# Patient Record
Sex: FEMALE | Race: WHITE | NOT HISPANIC OR LATINO | Employment: FULL TIME | URBAN - METROPOLITAN AREA
[De-identification: names, ages, dates, MRNs, and addresses within clinical notes are randomized per-mention and may not be internally consistent; named-entity substitution may affect disease eponyms.]

---

## 2019-03-28 ENCOUNTER — TELEPHONE (OUTPATIENT)
Dept: SCHEDULING | Facility: IMAGING CENTER | Age: 24
End: 2019-03-28

## 2019-03-29 ENCOUNTER — OFFICE VISIT (OUTPATIENT)
Dept: URGENT CARE | Facility: CLINIC | Age: 24
End: 2019-03-29
Payer: COMMERCIAL

## 2019-03-29 VITALS
OXYGEN SATURATION: 99 % | RESPIRATION RATE: 12 BRPM | HEART RATE: 82 BPM | TEMPERATURE: 97.7 F | SYSTOLIC BLOOD PRESSURE: 120 MMHG | HEIGHT: 66 IN | WEIGHT: 153.4 LBS | DIASTOLIC BLOOD PRESSURE: 84 MMHG | BODY MASS INDEX: 24.65 KG/M2

## 2019-03-29 DIAGNOSIS — R10.9 RIGHT FLANK PAIN: ICD-10-CM

## 2019-03-29 DIAGNOSIS — R30.0 DYSURIA: ICD-10-CM

## 2019-03-29 DIAGNOSIS — N30.01 ACUTE CYSTITIS WITH HEMATURIA: Primary | ICD-10-CM

## 2019-03-29 LAB
APPEARANCE UR: CLEAR
BILIRUB UR STRIP-MCNC: NORMAL MG/DL
COLOR UR AUTO: YELLOW
GLUCOSE UR STRIP.AUTO-MCNC: NORMAL MG/DL
KETONES UR STRIP.AUTO-MCNC: NORMAL MG/DL
LEUKOCYTE ESTERASE UR QL STRIP.AUTO: NORMAL
NITRITE UR QL STRIP.AUTO: NORMAL
PH UR STRIP.AUTO: 6 [PH] (ref 5–8)
PROT UR QL STRIP: NEGATIVE MG/DL
RBC UR QL AUTO: NORMAL
SP GR UR STRIP.AUTO: 1
UROBILINOGEN UR STRIP-MCNC: 0.2 MG/DL

## 2019-03-29 PROCEDURE — 81002 URINALYSIS NONAUTO W/O SCOPE: CPT | Performed by: NURSE PRACTITIONER

## 2019-03-29 PROCEDURE — 99203 OFFICE O/P NEW LOW 30 MIN: CPT | Performed by: NURSE PRACTITIONER

## 2019-03-29 RX ORDER — NITROFURANTOIN 25; 75 MG/1; MG/1
100 CAPSULE ORAL EVERY 12 HOURS
Qty: 10 CAP | Refills: 0 | Status: SHIPPED | OUTPATIENT
Start: 2019-03-29 | End: 2019-04-03

## 2019-03-29 RX ORDER — PHENAZOPYRIDINE HYDROCHLORIDE 200 MG/1
200 TABLET, FILM COATED ORAL 3 TIMES DAILY
Qty: 6 TAB | Refills: 0 | Status: SHIPPED | OUTPATIENT
Start: 2019-03-29 | End: 2019-03-31

## 2019-03-29 ASSESSMENT — ENCOUNTER SYMPTOMS
VOMITING: 0
NAUSEA: 0
FLANK PAIN: 1
SWEATS: 0
CHILLS: 0

## 2019-03-29 NOTE — PROGRESS NOTES
"Subjective:      Hannah Harry is a 23 y.o. female who presents with UTI (X 4 days, right side pain)    Denies past medical, surgical or family history that is significant to today's problem.   RX or OTC medications-- reviewed with patient today.   No Known Allergies          Dysuria    This is a new problem. The current episode started in the past 7 days. The problem has been gradually worsening. The quality of the pain is described as aching and burning. The pain is at a severity of 5/10. The pain is moderate. There has been no fever. She is sexually active. There is no history of pyelonephritis. Associated symptoms include flank pain (right side) and frequency. Pertinent negatives include no chills, discharge, hematuria, hesitancy, nausea, possible pregnancy, sweats, urgency or vomiting. Associated symptoms comments: LMP:  3 1/2  weeks ago  . She has tried NSAIDs and increased fluids for the symptoms. The treatment provided mild relief. There is no history of kidney stones, recurrent UTIs or urinary stasis.       Review of Systems   Constitutional: Negative for chills.   Gastrointestinal: Negative for nausea and vomiting.   Genitourinary: Positive for dysuria, flank pain (right side) and frequency. Negative for hematuria, hesitancy and urgency.          Objective:     /84 (BP Location: Left arm, Patient Position: Sitting, BP Cuff Size: Adult)   Pulse 82   Temp 36.5 °C (97.7 °F) (Temporal)   Resp 12   Ht 1.676 m (5' 6\")   Wt 69.6 kg (153 lb 6.4 oz)   SpO2 99%   BMI 24.76 kg/m²      Physical Exam   Constitutional: Vital signs are normal. She appears well-developed and well-nourished. She is cooperative.  Non-toxic appearance. She does not appear ill. No distress.   HENT:   Head: Normocephalic.   Eyes: Pupils are equal, round, and reactive to light.   Cardiovascular: Normal rate and regular rhythm.    Pulmonary/Chest: Effort normal and breath sounds normal.   Abdominal: Soft. Normal appearance. She " exhibits no distension. There is no tenderness. There is no rigidity, no rebound, no guarding and no CVA tenderness.   Musculoskeletal:        Lumbar back: Normal.   Neurological: She is alert.   Skin: Skin is warm and dry. Capillary refill takes less than 2 seconds. She is not diaphoretic.   Psychiatric: She has a normal mood and affect. Her behavior is normal. Thought content normal.   Nursing note and vitals reviewed.          Ref Range & Units Status     POC Color yellow  Negative Final   POC Appearance clear  Negative Final   POC Leukocyte Esterase small  Negative Final   POC Nitrites neg  Negative Final   POC Urobiligen 0.2  Negative (0.2) mg/dL Final   POC Protein negative  Negative mg/dL Final   POC Urine PH 6.0  5.0 - 8.0 Final   POC Blood moderate  Negative Final   POC Specific Gravity 1.005  <1.005 - >1.030 Final   POC Ketones neg  Negative mg/dL Final   POC Bilirubin neg  Negative mg/dL Final   POC Glucose neg  Negative mg/dL Final   Last Resulted Time   Fri Mar 29, 2019 11:42 AM            Assessment/Plan:     1. Acute cystitis with hematuria    - nitrofurantoin monohyd macro (MACROBID) 100 MG Cap; Take 1 Cap by mouth every 12 hours for 5 days.  Dispense: 10 Cap; Refill: 0    2. Dysuria    - POCT Urinalysis  - phenazopyridine (PYRIDIUM) 200 MG Tab; Take 1 Tab by mouth 3 times a day for 2 days.  Dispense: 6 Tab; Refill: 0    3. Right flank pain    - POCT Urinalysis      Educated in proper administration of medication(s) ordered today including safety, possible SE, risks, benefits, rationale and alternatives to therapy.     Keep well hydrated    Return to clinic or PCP 5-7  days if current symptoms are not resolving in a satisfactory manner or sooner if new or worsening symptoms occur.   Patient was advised of signs and symptoms which would warrant further evaluation and /or emergent evaluation in ER.  Verbalized agreement with this treatment plan and seemed to understand without barriers. Questions were  encouraged and answered to patients satisfaction.     Aftercare instructions were given to pt

## 2019-04-09 ENCOUNTER — OFFICE VISIT (OUTPATIENT)
Dept: MEDICAL GROUP | Age: 24
End: 2019-04-09
Payer: COMMERCIAL

## 2019-04-09 ENCOUNTER — HOSPITAL ENCOUNTER (OUTPATIENT)
Facility: MEDICAL CENTER | Age: 24
End: 2019-04-09
Attending: PHYSICIAN ASSISTANT
Payer: COMMERCIAL

## 2019-04-09 VITALS
WEIGHT: 155.6 LBS | HEIGHT: 67 IN | TEMPERATURE: 98.8 F | SYSTOLIC BLOOD PRESSURE: 106 MMHG | HEART RATE: 64 BPM | DIASTOLIC BLOOD PRESSURE: 62 MMHG | OXYGEN SATURATION: 96 % | BODY MASS INDEX: 24.42 KG/M2

## 2019-04-09 DIAGNOSIS — R35.0 URINARY FREQUENCY: ICD-10-CM

## 2019-04-09 DIAGNOSIS — N30.01 ACUTE CYSTITIS WITH HEMATURIA: Primary | ICD-10-CM

## 2019-04-09 DIAGNOSIS — Z76.89 ENCOUNTER TO ESTABLISH CARE WITH NEW DOCTOR: ICD-10-CM

## 2019-04-09 DIAGNOSIS — Z87.42 HISTORY OF ABNORMAL CERVICAL PAP SMEAR: ICD-10-CM

## 2019-04-09 LAB
APPEARANCE UR: CLEAR
BILIRUB UR STRIP-MCNC: NEGATIVE MG/DL
COLOR UR AUTO: NORMAL
GLUCOSE UR STRIP.AUTO-MCNC: NEGATIVE MG/DL
KETONES UR STRIP.AUTO-MCNC: NEGATIVE MG/DL
LEUKOCYTE ESTERASE UR QL STRIP.AUTO: NORMAL
NITRITE UR QL STRIP.AUTO: NEGATIVE
PH UR STRIP.AUTO: 6.5 [PH] (ref 5–8)
PROT UR QL STRIP: NEGATIVE MG/DL
RBC UR QL AUTO: NORMAL
SP GR UR STRIP.AUTO: 1.01
UROBILINOGEN UR STRIP-MCNC: 0.2 MG/DL

## 2019-04-09 PROCEDURE — 99214 OFFICE O/P EST MOD 30 MIN: CPT | Performed by: PHYSICIAN ASSISTANT

## 2019-04-09 PROCEDURE — 81002 URINALYSIS NONAUTO W/O SCOPE: CPT | Performed by: PHYSICIAN ASSISTANT

## 2019-04-09 PROCEDURE — 87077 CULTURE AEROBIC IDENTIFY: CPT

## 2019-04-09 PROCEDURE — 87186 SC STD MICRODIL/AGAR DIL: CPT

## 2019-04-09 PROCEDURE — 87086 URINE CULTURE/COLONY COUNT: CPT

## 2019-04-09 RX ORDER — SULFAMETHOXAZOLE AND TRIMETHOPRIM 800; 160 MG/1; MG/1
1 TABLET ORAL 2 TIMES DAILY
Qty: 10 TAB | Refills: 0 | Status: SHIPPED | OUTPATIENT
Start: 2019-04-09 | End: 2019-04-14

## 2019-04-09 RX ORDER — PHENAZOPYRIDINE HYDROCHLORIDE 100 MG/1
99.5 TABLET, FILM COATED ORAL
COMMUNITY
End: 2019-04-09

## 2019-04-09 ASSESSMENT — PATIENT HEALTH QUESTIONNAIRE - PHQ9: CLINICAL INTERPRETATION OF PHQ2 SCORE: 0

## 2019-04-09 NOTE — PROGRESS NOTES
This medical record contains text that has been entered with the assistance of computer voice recognition and dictation software.  Therefore, it may contain unintended errors in text, spelling, punctuation, or grammar    Chief Complaint   Patient presents with   • Establish Care   • Abnormal Pap Smear     2016, 2017   • UTI      3-29-19 still has symptoms sensation, frequency urination         Hannah Angel is a 23 y.o. female here evaluation and management of: Establish care and urinary frequency      HPI:     Hannah presents to [facility].  She has never been seen by her primary care provider, she was just going to the [facility] clinic when needed.  She works at [employer].  She has had 2 abnormal Paps, last was in 2017, she was advised to repeat Pap in 2018, but was unable to do this.  She is currently in a monogamous relationship.  She is on birth control, which she tolerates well.    Urinary frequency  -She was seen in urgent care 3/29 and treated for urinary tract infection.  She completed a 5-day course of Macrobid.  Her symptoms seem to be improving, however when she stopped the antibiotics she still continued to have urinary frequency/urgency-however, this is improving.  She would like to ensure that she still does not have urinary tract infection.  Denies dysuria, hematuria, back pain, suprapubic pain, nausea, vomiting, fevers, or chills.    Current medicines (including changes today)  Current Outpatient Prescriptions   Medication Sig Dispense Refill   • Norgestim-Eth Estrad Triphasic (NORGESTIMATE-ETHINYL ESTRADIOL PO) Take  by mouth.     • sulfamethoxazole-trimethoprim (BACTRIM DS) 800-160 MG tablet Take 1 Tab by mouth 2 times a day for 5 days. 10 Tab 0     No current facility-administered medications for this visit.      She  has no past medical history on file.  She  has no past surgical history on file.  Social History   Substance Use Topics   • Smoking status: Never Smoker   • Smokeless  "tobacco: Never Used   • Alcohol use Yes      Comment: occasionally     Social History     Social History Narrative   • No narrative on file     Family History   Problem Relation Age of Onset   • Hypertension Mother    • No Known Problems Father      Family Status   Relation Status   • Mo Alive   • Fa Alive         ROS    Please see hpi      Objective:     /62 (BP Location: Right arm, Patient Position: Sitting, BP Cuff Size: Adult)   Pulse 64   Temp 37.1 °C (98.8 °F) (Temporal)   Ht 1.702 m (5' 7\")   Wt 70.6 kg (155 lb 9.6 oz)   SpO2 96%  Body mass index is 24.37 kg/m².  Physical Exam:    Constitutional: Alert, no distress.  Skin: Warm, dry, good turgor, no rashes in visible areas  Eye: conjunctiva clear, lids normal.  ENMT: Lips without lesions, good dentition, oropharynx clear.  Neck: Trachea midline, no masses, no thyromegaly. No cervical or supraclavicular lymphadenopathy.  Respiratory: Unlabored respiratory effort, lungs clear to auscultation, no wheezes, no ronchi.  Cardiovascular: Normal S1, S2, no murmur  Abdomen: Soft, non-tender, no masses, no hepatosplenomegaly, no suprapubic tenderness.  Back: No CVA tenderness  Psych: Alert and oriented x3, normal affect and mood.    Results for TRELL BUSTOS (MRN [id number]) as of 4/9/2019 17:17   Ref. Range 4/9/2019 17:04   POC Color Latest Ref Range: Negative  PALE   POC Appearance Latest Ref Range: Negative  CLEAR   POC Specific Gravity Latest Ref Range: <1.005 - >1.030  1.010   POC Urine PH Latest Ref Range: 5.0 - 8.0  6.5   POC Glucose Latest Ref Range: Negative mg/dL NEGATIVE   POC Ketones Latest Ref Range: Negative mg/dL NEGATIVE   POC Protein Latest Ref Range: Negative mg/dL NEGATIVE   POC Nitrites Latest Ref Range: Negative  NEGATIVE   POC Leukocyte Esterase Latest Ref Range: Negative  MODERATE   POC Blood Latest Ref Range: Negative  TRACE   POC Bilirubin Latest Ref Range: Negative mg/dL NEGATIVE   POC Urobiligen Latest Ref Range: Negative (0.2) " mg/dL 0.2         Assessment and Plan:   The following treatment plan was discussed    1.  Acute cystitis with hematuria  -UA was positive for leuks and Heme.  She is still symptomatic.  She was already treated with Macrobid. Will  start on Bactrim for 5 days.  Urine sent for culture.  Recommended increased hydration, urination after intercourse, and proper hygiene.  Advised that if she has worsening of symptoms, presentation of back pain, fever, nausea.  Follow-up immediately.    2. Urinary frequency  - POCT Urinalysis    3. Encounter to establish care with new doctor  -Advised to increase physical activity as tolerated and regular diet controlled.  Follow-up in 6 months for annual physical    4. History of abnormal cervical Pap smear  -She will follow-up with gynecology for Pap            Instructed to Follow up in clinic or ER for worsening symptoms, difficulty breathing, lack of expected recovery, or should new symptoms or problems arise.    Followup: Return in about 6 months (around 10/9/2019) for Annual PX.       Once again this medical record contains text that has been entered with the assistance of computer voice recognition and dictation software.  Therefore, it may contain unintended errors in text, spelling, punctuation, or grammar

## 2019-04-12 LAB
BACTERIA UR CULT: ABNORMAL
BACTERIA UR CULT: ABNORMAL
SIGNIFICANT IND 70042: ABNORMAL
SITE SITE: ABNORMAL
SOURCE SOURCE: ABNORMAL

## 2019-06-28 ENCOUNTER — OFFICE VISIT (OUTPATIENT)
Dept: URGENT CARE | Facility: CLINIC | Age: 24
End: 2019-06-28
Payer: COMMERCIAL

## 2019-06-28 ENCOUNTER — HOSPITAL ENCOUNTER (OUTPATIENT)
Facility: MEDICAL CENTER | Age: 24
End: 2019-06-28
Attending: PHYSICIAN ASSISTANT
Payer: COMMERCIAL

## 2019-06-28 VITALS
TEMPERATURE: 97.8 F | RESPIRATION RATE: 18 BRPM | OXYGEN SATURATION: 98 % | HEART RATE: 75 BPM | SYSTOLIC BLOOD PRESSURE: 132 MMHG | WEIGHT: 155 LBS | BODY MASS INDEX: 24.33 KG/M2 | HEIGHT: 67 IN | DIASTOLIC BLOOD PRESSURE: 68 MMHG

## 2019-06-28 DIAGNOSIS — R30.0 DYSURIA: Primary | ICD-10-CM

## 2019-06-28 DIAGNOSIS — R30.0 DYSURIA: ICD-10-CM

## 2019-06-28 LAB
APPEARANCE UR: NORMAL
BILIRUB UR STRIP-MCNC: NORMAL MG/DL
COLOR UR AUTO: NORMAL
GLUCOSE UR STRIP.AUTO-MCNC: NORMAL MG/DL
INT CON NEG: NORMAL
INT CON POS: NORMAL
KETONES UR STRIP.AUTO-MCNC: NORMAL MG/DL
LEUKOCYTE ESTERASE UR QL STRIP.AUTO: NORMAL
NITRITE UR QL STRIP.AUTO: NORMAL
PH UR STRIP.AUTO: 5.5 [PH] (ref 5–8)
POC URINE PREGNANCY TEST: NORMAL
PROT UR QL STRIP: 300 MG/DL
RBC UR QL AUTO: NORMAL
SP GR UR STRIP.AUTO: 1.03
UROBILINOGEN UR STRIP-MCNC: 0.2 MG/DL

## 2019-06-28 PROCEDURE — 87086 URINE CULTURE/COLONY COUNT: CPT

## 2019-06-28 PROCEDURE — 81025 URINE PREGNANCY TEST: CPT | Performed by: PHYSICIAN ASSISTANT

## 2019-06-28 PROCEDURE — 87077 CULTURE AEROBIC IDENTIFY: CPT

## 2019-06-28 PROCEDURE — 87186 SC STD MICRODIL/AGAR DIL: CPT

## 2019-06-28 PROCEDURE — 81002 URINALYSIS NONAUTO W/O SCOPE: CPT | Performed by: PHYSICIAN ASSISTANT

## 2019-06-28 PROCEDURE — 99214 OFFICE O/P EST MOD 30 MIN: CPT | Performed by: PHYSICIAN ASSISTANT

## 2019-06-28 RX ORDER — PHENAZOPYRIDINE HYDROCHLORIDE 200 MG/1
200 TABLET, FILM COATED ORAL 3 TIMES DAILY
Qty: 6 TAB | Refills: 0 | Status: SHIPPED | OUTPATIENT
Start: 2019-06-28 | End: 2019-06-28 | Stop reason: SDUPTHER

## 2019-06-28 RX ORDER — SULFAMETHOXAZOLE AND TRIMETHOPRIM 800; 160 MG/1; MG/1
1 TABLET ORAL EVERY 12 HOURS
Qty: 10 TAB | Refills: 0 | Status: SHIPPED | OUTPATIENT
Start: 2019-06-28 | End: 2019-06-28 | Stop reason: SDUPTHER

## 2019-06-28 RX ORDER — PHENAZOPYRIDINE HYDROCHLORIDE 200 MG/1
200 TABLET, FILM COATED ORAL 3 TIMES DAILY
Qty: 6 TAB | Refills: 0 | Status: SHIPPED | OUTPATIENT
Start: 2019-06-28 | End: 2019-06-30

## 2019-06-28 RX ORDER — SULFAMETHOXAZOLE AND TRIMETHOPRIM 800; 160 MG/1; MG/1
1 TABLET ORAL EVERY 12 HOURS
Qty: 10 TAB | Refills: 0 | Status: SHIPPED | OUTPATIENT
Start: 2019-06-28 | End: 2019-07-03

## 2019-06-28 NOTE — PROGRESS NOTES
Subjective:      Pt is a 24 y.o. female who presents with Dysuria            HPI  This is a new problem. PT comes into the UC with a chief complaint of dysuria, burning on urination, urgency, frequency, and bladder pressure x 3 days. PT denies fevers or chills, CP, SOB, NVD, paresthesias, headaches, dizziness, change in vision, hives, or joint pain. PT states the pain is a 5/10 with burning upon urination, aching in nature and worse at night. Pt states they have not taken any RX meds for this issue. Pt denies flank or back pain as well. The pt's medication list, problem list, and allergies have been evaluated and reviewed during today's visit.      PMH:  Negative per pt.      PSH:  Negative per pt.      Fam Hx:    family history includes Hypertension in her mother; No Known Problems in her father.  Family Status   Relation Status   • Mo Alive   • Fa Alive       Soc HX:  Social History     Social History   • Marital status: Single     Spouse name: N/A   • Number of children: N/A   • Years of education: N/A     Occupational History   • Not on file.     Social History Main Topics   • Smoking status: Never Smoker   • Smokeless tobacco: Never Used   • Alcohol use Yes      Comment: occasionally   • Drug use: No   • Sexual activity: Yes     Partners: Male     Birth control/ protection: Pill     Other Topics Concern   • Not on file     Social History Narrative   • No narrative on file         Medications:    Current Outpatient Prescriptions:   •  phenazopyridine (PYRIDIUM) 200 MG Tab, Take 1 Tab by mouth 3 times a day for 2 days., Disp: 6 Tab, Rfl: 0  •  sulfamethoxazole-trimethoprim (BACTRIM DS) 800-160 MG tablet, Take 1 Tab by mouth every 12 hours for 5 days., Disp: 10 Tab, Rfl: 0  •  Norgestim-Eth Estrad Triphasic (NORGESTIMATE-ETHINYL ESTRADIOL PO), Take  by mouth., Disp: , Rfl:       Allergies:  Patient has no known allergies.    ROS  Review of Systems   Constitutional: Negative for fever, chills and malaise/fatigue.    HENT: Negative for congestion and sore throat.    Eyes: Negative for blurred vision, double vision and photophobia.   Respiratory: Negative for cough and shortness of breath.  Cardiovascular: Negative for chest pain and palpitations.   Gastrointestinal: Negative for nausea, vomiting, abdominal pain, diarrhea and constipation.   Genitourinary: Positive for dysuria, urgency and frequency.   Musculoskeletal: Negative for joint pain and myalgias.   Skin: Negative for rash.   Neurological: Negative for dizziness, tingling and headaches.   Endo/Heme/Allergies: Does not bruise/bleed easily.   Psychiatric/Behavioral: Negative for depression. The patient is not nervous/anxious.           Objective:     Vitals:    06/28/19 1543   BP: 132/68   Pulse: 75   Resp: 18   Temp: 36.6 °C (97.8 °F)   SpO2: 98%         Physical Exam       Physical Exam   Constitutional: She is oriented to person, place, and time. She appears well-developed and well-nourished. No distress.   HENT:   Head: Normocephalic and atraumatic.   Right Ear: External ear normal.   Left Ear: External ear normal.   Nose: Nose normal.   Mouth/Throat: Oropharynx is clear and moist. No oropharyngeal exudate.   Eyes: Conjunctivae normal and EOM are normal. Pupils are equal, round, and reactive to light.   Neck: Normal range of motion. Neck supple. No thyromegaly present.   Cardiovascular: Normal rate, regular rhythm, normal heart sounds and intact distal pulses.  Exam reveals no gallop and no friction rub.    No murmur heard.  Pulmonary/Chest: Effort normal and breath sounds normal. No respiratory distress. She has no wheezes. She has no rales. She exhibits no tenderness.   Abdominal: Soft. Bowel sounds are normal. She exhibits no distension and no mass. There is no tenderness. There is no rebound and no guarding.   Genitourinary:        Pt deferred   Musculoskeletal: Normal range of motion. She exhibits no edema and no tenderness.   Lymphadenopathy:     She has no  cervical adenopathy.   Neurological: She is alert and oriented to person, place, and time. She has normal reflexes. No cranial nerve deficit.   Skin: Skin is warm and dry. No rash noted. No erythema.   Psychiatric: She has a normal mood and affect. Her behavior is normal. Judgment and thought content normal.        Assessment/Plan:     1. Dysuria    - POCT Urinalysis-->LEUKS AND BLOOD AND NITRITES  - POCT PREGNANCY-->NEG  - Urine Culture; Future  - phenazopyridine (PYRIDIUM) 200 MG Tab; Take 1 Tab by mouth 3 times a day for 2 days.  Dispense: 6 Tab; Refill: 0  - sulfamethoxazole-trimethoprim (BACTRIM DS) 800-160 MG tablet; Take 1 Tab by mouth every 12 hours for 5 days.  Dispense: 10 Tab; Refill: 0    Rest, fluids encouraged.  AVS with medical info given.  Pt was in full understanding and agreement with the plan.  Differential diagnosis, natural history, supportive care, and indications for immediate follow-up discussed. All questions answered. Patient agrees with the plan of care.  Follow-up as needed if symptoms worsen or fail to improve.

## 2019-07-03 ENCOUNTER — TELEPHONE (OUTPATIENT)
Dept: URGENT CARE | Facility: PHYSICIAN GROUP | Age: 24
End: 2019-07-03

## 2019-07-03 NOTE — TELEPHONE ENCOUNTER
Called and left message with pt about urine culture results which came back positive for Staphylococcus saprophyticus   >100,000 cfu/mL    I told her she could continue the abx therapy with a positive urine culture which was sensitive to the abx she was placed on.  Encouraged Pt to call back with questions.  Kwame Ritter PA-C

## 2019-08-26 NOTE — PROGRESS NOTES
Well Woman Exam    CC: well woman exam        HPI: Hannah Angel is a 24 y.o. G0   female who presents for her Annual Gynecologic Exam  Pt has no complaints.  Currently using condoms for contraception, sexually active with 1 male partner.  Would like nexplanon.         at [facility] Maintenance:  Pap: reports hx of ascus x2, Was told if abnormal that she would need colposcopy.  Not sure if they did HPV testing   Gardisil: not completed    ROS:  Gen: denies fevers, general concerns  CV/resp: reports no concerns  Breasts: no masses/changes  GI: denies abd pain, GI concerns  : denies irregular vaginal bleeding, discharge, pain, denies urinary complaints  Neuro: denies hx of migraines  Endo: Reports no significant weight changes, irregular menses, temperature intolerance, hotflashes/nightsweats  Psych: Reports no concerns about mood, feels well    OBHx: G0    GYN Hx:   14/monthly/5-7  Denies hx of STIs  Reports prior ASCUS x2, no colposcopies.    PMHx: denies  PSHx: denies    Medications:   none    Allergies: Patient has no known allergies.    SocHx: denies tob/drugs/EtOH      Family History   Problem Relation Age of Onset   • Hypertension Mother    • No Known Problems Father    • Hypertension Maternal Grandfather    • Cancer Maternal Grandfather         Colon         Physical Exam   /76 (BP Location: Left arm, Patient Position: Sitting)   Wt 68.9 kg (152 lb)   LMP 08/12/2019   Breastfeeding? No   BMI 23.81 kg/m²   gen: AAO, NAD, affect appropriate  Neck: non-tender, no masses, no thyromegaly/nodules appreciated  CV: RRR; no LE edema  resp: ctab  Breast: symmetric, no skin changes, no masses, nontender, no nipple discharge, no lymphadenopathy  abd: soft, NT, ND, no masses, no organomegaly, no hernias  : NEFG, normal urethral meatus, normal anus/perineum, normal vagina and cervix.  Uterus small, anteverted, no adnexal masses/tenderness  Skin: warm/dry, no lesions    Breast and  pelvic exam chaperoned by MA (AB).  Assessment:   24 y.o. G0 here for well woman exam  1. Encounter for well woman exam with routine gynecological exam  REFERRAL TO GYNECOLOGY   2. Encounter for counseling regarding contraception  REFERRAL TO GYNECOLOGY   3. Cervical cancer screening  THINPREP RFLX HPV ASCUS W/CTNG    Consent for HPV   4. STI (sexually transmitted infection)  THINPREP RFLX HPV ASCUS W/CTNG   5. Need for HPV vaccination  9VHPV Vaccine 2-3 Dose IM       Plan:   Pap:collected, reviewed pap guidelines  STI screen: Excepting of gonorrhea/chlamydia screen today    Contraception: discussed options briefly, patient arrived with plan for Nexplanon.  Discussed can have irregular bleeding with this which typically but not always improves with time.  Encourage patient to return for troubleshooting with irregular bleeding if it is persistent after Nexplanon insertion.  Device ordered today, to return when available.  Patient accepting of offer for birth control pill prior to Nexplanon placement for improved contraception    Recommend Gardasil vaccination, patient accepting of this.  First injection given today.  Discussed the next injection will be in 1 to 2 months (likely will be able to do at time of Nexplanon placement if it has been 1 month since first injection) and last injection 4 months after the second or approx 6 months from now.    F/u for gardisil #2 1-2mos and for nexplanon placement when device available.    Rosi Colin MD  [facility], Women's Health

## 2019-08-27 ENCOUNTER — GYNECOLOGY VISIT (OUTPATIENT)
Dept: OBGYN | Facility: MEDICAL CENTER | Age: 24
End: 2019-08-27
Payer: COMMERCIAL

## 2019-08-27 ENCOUNTER — HOSPITAL ENCOUNTER (OUTPATIENT)
Facility: MEDICAL CENTER | Age: 24
End: 2019-08-27
Attending: OBSTETRICS & GYNECOLOGY
Payer: COMMERCIAL

## 2019-08-27 VITALS — DIASTOLIC BLOOD PRESSURE: 76 MMHG | SYSTOLIC BLOOD PRESSURE: 104 MMHG | BODY MASS INDEX: 23.81 KG/M2 | WEIGHT: 152 LBS

## 2019-08-27 DIAGNOSIS — Z12.4 CERVICAL CANCER SCREENING: ICD-10-CM

## 2019-08-27 DIAGNOSIS — Z23 NEED FOR HPV VACCINATION: ICD-10-CM

## 2019-08-27 DIAGNOSIS — Z01.419 ENCOUNTER FOR WELL WOMAN EXAM WITH ROUTINE GYNECOLOGICAL EXAM: ICD-10-CM

## 2019-08-27 DIAGNOSIS — Z30.09 ENCOUNTER FOR COUNSELING REGARDING CONTRACEPTION: ICD-10-CM

## 2019-08-27 DIAGNOSIS — A64 STI (SEXUALLY TRANSMITTED INFECTION): ICD-10-CM

## 2019-08-27 PROCEDURE — 88175 CYTOPATH C/V AUTO FLUID REDO: CPT

## 2019-08-27 PROCEDURE — 90471 IMMUNIZATION ADMIN: CPT | Performed by: OBSTETRICS & GYNECOLOGY

## 2019-08-27 PROCEDURE — 90651 9VHPV VACCINE 2/3 DOSE IM: CPT | Performed by: OBSTETRICS & GYNECOLOGY

## 2019-08-27 PROCEDURE — 87591 N.GONORRHOEAE DNA AMP PROB: CPT

## 2019-08-27 PROCEDURE — 87491 CHLMYD TRACH DNA AMP PROBE: CPT

## 2019-08-27 PROCEDURE — 99385 PREV VISIT NEW AGE 18-39: CPT | Mod: 25 | Performed by: OBSTETRICS & GYNECOLOGY

## 2019-08-27 RX ORDER — LEVONORGESTREL AND ETHINYL ESTRADIOL 0.1-0.02MG
1 KIT ORAL DAILY
Qty: 28 TAB | Refills: 3 | Status: SHIPPED | OUTPATIENT
Start: 2019-08-27

## 2019-08-29 LAB
C TRACH DNA GENITAL QL NAA+PROBE: NEGATIVE
CYTOLOGY REG CYTOL: NORMAL
N GONORRHOEA DNA GENITAL QL NAA+PROBE: NEGATIVE
SPECIMEN SOURCE: NORMAL

## 2019-10-01 ENCOUNTER — GYNECOLOGY VISIT (OUTPATIENT)
Dept: OBGYN | Facility: MEDICAL CENTER | Age: 24
End: 2019-10-01
Payer: COMMERCIAL

## 2019-10-01 VITALS — WEIGHT: 151 LBS | BODY MASS INDEX: 23.65 KG/M2 | SYSTOLIC BLOOD PRESSURE: 100 MMHG | DIASTOLIC BLOOD PRESSURE: 60 MMHG

## 2019-10-01 DIAGNOSIS — Z30.017 NEXPLANON INSERTION: ICD-10-CM

## 2019-10-01 DIAGNOSIS — Z23 NEED FOR HPV VACCINATION: ICD-10-CM

## 2019-10-01 LAB
INT CON NEG: NEGATIVE
INT CON POS: POSITIVE
POC URINE PREGNANCY TEST: NEGATIVE

## 2019-10-01 PROCEDURE — 90651 9VHPV VACCINE 2/3 DOSE IM: CPT | Performed by: OBSTETRICS & GYNECOLOGY

## 2019-10-01 PROCEDURE — 90471 IMMUNIZATION ADMIN: CPT | Performed by: OBSTETRICS & GYNECOLOGY

## 2019-10-01 PROCEDURE — 81025 URINE PREGNANCY TEST: CPT | Performed by: OBSTETRICS & GYNECOLOGY

## 2019-10-01 PROCEDURE — 11981 INSERTION DRUG DLVR IMPLANT: CPT | Performed by: OBSTETRICS & GYNECOLOGY

## 2019-10-01 NOTE — PROGRESS NOTES
Procedure Note : Nexplanon Insertion      CC: desires nexplanon     HPI: 24 y.o.yo  here for desired nexplanon insertion.  Denies concerns  UPT neg, denies unprotected intercourse within the last 2 weeks.    Also due for Gardasil #2 and would like this today if possible.     Past Medical History:   Diagnosis Date   • Bronchitis    • Sinusitis    • Urinary tract infection      Allergies: Patient has no known allergies.       ROS:   Gen: denies concerns  : denies concerns        Nexplanon Insertion  Pt is left handed  right upper extremity positioned so the medial aspect exposed.  Inferior to the groove between the biceps/triceps, over the triceps, and approximately 8-10cm from the medial epicondyle  cleansed with betadine x3. approximately 3cc of 1% licocaine infiltrated along planned insertion path.    Nexplanon applicator placed with bevel of needle down, skin lifted, device inserted to hub   Lever deployed and nexplanon released. Implant palpated by myself and patient in proper location   Small needle puncture hemostatic, steri strip placed, pressure dressing applied  Pt tolerated procedures well.  Complications: none     Lot: [id number]  Exp: 2022     A/P: 24 y.o.  s/p desired nexplanon insertion   - prior to insertion discussed risks including pain/bleeding/infection. Advised to expect some bruising.  Discussed likely irregular VB, possible amenorrhea with nexplanon as well as risk of pregnancy.  All questions answered and consents signed.  Remove pressure dressing tonight or when uncomfortable    gardisil #2 today    RTC for gardisil #3 approximately 4 mos        Rosi Colin MD  [facility], Women's Health

## 2020-01-13 ENCOUNTER — OFFICE VISIT (OUTPATIENT)
Dept: URGENT CARE | Facility: CLINIC | Age: 25
End: 2020-01-13
Payer: COMMERCIAL

## 2020-01-13 VITALS
HEART RATE: 98 BPM | WEIGHT: 158 LBS | TEMPERATURE: 98.9 F | RESPIRATION RATE: 20 BRPM | SYSTOLIC BLOOD PRESSURE: 112 MMHG | DIASTOLIC BLOOD PRESSURE: 70 MMHG | BODY MASS INDEX: 24.75 KG/M2 | OXYGEN SATURATION: 98 %

## 2020-01-13 DIAGNOSIS — J06.9 VIRAL URI WITH COUGH: ICD-10-CM

## 2020-01-13 PROCEDURE — 99213 OFFICE O/P EST LOW 20 MIN: CPT | Performed by: PHYSICIAN ASSISTANT

## 2020-01-13 ASSESSMENT — ENCOUNTER SYMPTOMS
MYALGIAS: 0
FEVER: 1
SORE THROAT: 1
COUGH: 1
VOMITING: 0
NAUSEA: 0
EYE REDNESS: 0
EYE DISCHARGE: 0
SHORTNESS OF BREATH: 0
HEADACHES: 0
WHEEZING: 0

## 2020-01-13 NOTE — PROGRESS NOTES
Subjective:      Hannah Angel is a 24 y.o. female who presents with Cough (Almost a week dry cough, runny nose and hoarseness)        Cough   This is a new problem. Episode onset: x 1 week ago. The problem has been unchanged. The problem occurs constantly. The cough is productive of sputum. Associated symptoms include a fever (The patient reports a subjective fever, now resolved), nasal congestion, postnasal drip and a sore throat (now improved). Pertinent negatives include no chest pain, ear pain, eye redness, headaches, myalgias, rash, shortness of breath or wheezing. Nothing aggravates the symptoms. She has tried OTC cough suppressant for the symptoms. The treatment provided mild relief.     PMH:  has a past medical history of Bronchitis, Sinusitis, and Urinary tract infection.  MEDS:   Current Outpatient Medications:   •  etonogestrel (NEXPLANON) 68 MG Implant implant, Inject 1 Each as instructed Once., Disp: , Rfl:   •  levonorgestrel-ethinyl estradiol (AVIANE, ALESSE, LESSINA) 0.1-20 MG-MCG per tablet, Take 1 Tab by mouth every day. (Patient not taking: Reported on 1/13/2020), Disp: 28 Tab, Rfl: 3  •  Norgestim-Eth Estrad Triphasic (NORGESTIMATE-ETHINYL ESTRADIOL PO), Take  by mouth., Disp: , Rfl:   ALLERGIES: No Known Allergies  SURGHX: No past surgical history on file.  SOCHX:  reports that she has never smoked. She has never used smokeless tobacco. She reports previous alcohol use. She reports that she does not use drugs.  FH: Family history was reviewed, no pertinent findings to report    Review of Systems   Constitutional: Positive for fever (The patient reports a subjective fever, now resolved).   HENT: Positive for congestion, postnasal drip and sore throat (now improved). Negative for ear pain.    Eyes: Negative for discharge and redness.   Respiratory: Positive for cough. Negative for shortness of breath and wheezing.    Cardiovascular: Negative for chest pain and leg swelling.    Gastrointestinal: Negative for nausea and vomiting.   Musculoskeletal: Negative for myalgias.   Skin: Negative for rash.   Neurological: Negative for headaches.          Objective:     /70   Pulse 98   Temp 37.2 °C (98.9 °F) (Temporal)   Resp 20   Wt 71.7 kg (158 lb)   SpO2 98%   BMI 24.75 kg/m²      Physical Exam  Constitutional:       General: She is not in acute distress.     Appearance: Normal appearance.   HENT:      Head: Normocephalic and atraumatic.      Right Ear: Tympanic membrane, ear canal and external ear normal.      Left Ear: Tympanic membrane, ear canal and external ear normal.      Nose: Nose normal.      Mouth/Throat:      Mouth: Mucous membranes are moist.      Pharynx: Oropharynx is clear. Uvula midline. No posterior oropharyngeal erythema.      Tonsils: No tonsillar exudate.   Eyes:      Extraocular Movements: Extraocular movements intact.      Conjunctiva/sclera: Conjunctivae normal.   Neck:      Musculoskeletal: Normal range of motion and neck supple.   Cardiovascular:      Rate and Rhythm: Normal rate and regular rhythm.      Heart sounds: Normal heart sounds.   Pulmonary:      Effort: Pulmonary effort is normal. No respiratory distress.      Breath sounds: Normal breath sounds. No wheezing.   Musculoskeletal: Normal range of motion.   Skin:     General: Skin is warm and dry.   Neurological:      Mental Status: She is alert and oriented to person, place, and time.                 Assessment/Plan:     1. Viral URI with cough    Differential diagnoses, supportive care, and indications for immediate follow-up discussed with patient.   Instructed to return to clinic or nearest emergency department for any change in condition, further concerns, or worsening of symptoms.    OTC Tylenol or Motrin for fever/discomfort.  OTC cough/cold medication for symptomatic relief  OTC Flonase for symptomatic relief   OTC Supportive Care for Congestion - saline nasal spray or neti pot  Drink plenty  of fluids  Follow-up with PCP  Return to clinic or go to the ED if symptoms worsen or fail to improve, or if the patient should develop worsening/increasing cough, congestion, ear pain, sore throat, shortness of breath, wheezing, chest pain, fever/chills, and/or any concerning symptoms.    Discussed plan with the patient, and she agrees to the above.

## 2020-03-10 ENCOUNTER — NON-PROVIDER VISIT (OUTPATIENT)
Dept: OBGYN | Facility: CLINIC | Age: 25
End: 2020-03-10
Payer: COMMERCIAL

## 2020-03-10 DIAGNOSIS — Z23 NEED FOR HPV VACCINATION: ICD-10-CM
